# Patient Record
Sex: MALE | Race: WHITE | Employment: FULL TIME | URBAN - METROPOLITAN AREA
[De-identification: names, ages, dates, MRNs, and addresses within clinical notes are randomized per-mention and may not be internally consistent; named-entity substitution may affect disease eponyms.]

---

## 2020-04-02 ENCOUNTER — HOSPITAL ENCOUNTER (EMERGENCY)
Age: 55
Discharge: HOME | End: 2020-04-02
Attending: EMERGENCY MEDICINE
Payer: COMMERCIAL

## 2020-04-02 ENCOUNTER — APPOINTMENT (OUTPATIENT)
Dept: CT IMAGING | Age: 55
End: 2020-04-02
Attending: EMERGENCY MEDICINE
Payer: COMMERCIAL

## 2020-04-02 VITALS
RESPIRATION RATE: 16 BRPM | TEMPERATURE: 98.4 F | HEART RATE: 77 BPM | OXYGEN SATURATION: 96 % | SYSTOLIC BLOOD PRESSURE: 156 MMHG | DIASTOLIC BLOOD PRESSURE: 81 MMHG | BODY MASS INDEX: 33.04 KG/M2 | WEIGHT: 218 LBS | HEIGHT: 68 IN

## 2020-04-02 DIAGNOSIS — N20.1 URETEROLITHIASIS: Primary | ICD-10-CM

## 2020-04-02 LAB
ALBUMIN SERPL-MCNC: 3.2 G/DL (ref 3.5–5)
ALBUMIN/GLOB SERPL: 0.9 {RATIO} (ref 1.2–3.5)
ALP SERPL-CCNC: 46 U/L (ref 50–136)
ALT SERPL-CCNC: 38 U/L (ref 12–65)
ANION GAP SERPL CALC-SCNC: 8 MMOL/L (ref 7–16)
AST SERPL-CCNC: 23 U/L (ref 15–37)
BASOPHILS # BLD: 0 K/UL (ref 0–0.2)
BASOPHILS NFR BLD: 1 % (ref 0–2)
BILIRUB SERPL-MCNC: 0.2 MG/DL (ref 0.2–1.1)
BUN SERPL-MCNC: 15 MG/DL (ref 6–23)
CALCIUM SERPL-MCNC: 8 MG/DL (ref 8.3–10.4)
CHLORIDE SERPL-SCNC: 111 MMOL/L (ref 98–107)
CO2 SERPL-SCNC: 24 MMOL/L (ref 21–32)
CREAT SERPL-MCNC: 0.92 MG/DL (ref 0.8–1.5)
DIFFERENTIAL METHOD BLD: NORMAL
EOSINOPHIL # BLD: 0.1 K/UL (ref 0–0.8)
EOSINOPHIL NFR BLD: 2 % (ref 0.5–7.8)
ERYTHROCYTE [DISTWIDTH] IN BLOOD BY AUTOMATED COUNT: 13.1 % (ref 11.9–14.6)
GLOBULIN SER CALC-MCNC: 3.5 G/DL (ref 2.3–3.5)
GLUCOSE SERPL-MCNC: 171 MG/DL (ref 65–100)
HCT VFR BLD AUTO: 42 % (ref 41.1–50.3)
HGB BLD-MCNC: 13.7 G/DL (ref 13.6–17.2)
IMM GRANULOCYTES # BLD AUTO: 0 K/UL (ref 0–0.5)
IMM GRANULOCYTES NFR BLD AUTO: 0 % (ref 0–5)
LYMPHOCYTES # BLD: 1.3 K/UL (ref 0.5–4.6)
LYMPHOCYTES NFR BLD: 16 % (ref 13–44)
MCH RBC QN AUTO: 31.1 PG (ref 26.1–32.9)
MCHC RBC AUTO-ENTMCNC: 32.6 G/DL (ref 31.4–35)
MCV RBC AUTO: 95.5 FL (ref 79.6–97.8)
MONOCYTES # BLD: 0.6 K/UL (ref 0.1–1.3)
MONOCYTES NFR BLD: 7 % (ref 4–12)
NEUTS SEG # BLD: 6.3 K/UL (ref 1.7–8.2)
NEUTS SEG NFR BLD: 75 % (ref 43–78)
NRBC # BLD: 0 K/UL (ref 0–0.2)
PLATELET # BLD AUTO: 163 K/UL (ref 150–450)
PMV BLD AUTO: 10 FL (ref 9.4–12.3)
POTASSIUM SERPL-SCNC: 3.9 MMOL/L (ref 3.5–5.1)
PROT SERPL-MCNC: 6.7 G/DL (ref 6.3–8.2)
RBC # BLD AUTO: 4.4 M/UL (ref 4.23–5.6)
SODIUM SERPL-SCNC: 143 MMOL/L (ref 136–145)
WBC # BLD AUTO: 8.3 K/UL (ref 4.3–11.1)

## 2020-04-02 PROCEDURE — 80053 COMPREHEN METABOLIC PANEL: CPT

## 2020-04-02 PROCEDURE — 96375 TX/PRO/DX INJ NEW DRUG ADDON: CPT

## 2020-04-02 PROCEDURE — 96374 THER/PROPH/DIAG INJ IV PUSH: CPT

## 2020-04-02 PROCEDURE — 74176 CT ABD & PELVIS W/O CONTRAST: CPT

## 2020-04-02 PROCEDURE — 85025 COMPLETE CBC W/AUTO DIFF WBC: CPT

## 2020-04-02 PROCEDURE — 99284 EMERGENCY DEPT VISIT MOD MDM: CPT

## 2020-04-02 PROCEDURE — 74011250636 HC RX REV CODE- 250/636: Performed by: EMERGENCY MEDICINE

## 2020-04-02 RX ORDER — DICLOFENAC SODIUM 50 MG/1
50 TABLET, DELAYED RELEASE ORAL 2 TIMES DAILY
Qty: 20 TAB | Refills: 0 | Status: SHIPPED | OUTPATIENT
Start: 2020-04-02

## 2020-04-02 RX ORDER — MORPHINE SULFATE 2 MG/ML
4 INJECTION, SOLUTION INTRAMUSCULAR; INTRAVENOUS ONCE
Status: COMPLETED | OUTPATIENT
Start: 2020-04-02 | End: 2020-04-02

## 2020-04-02 RX ORDER — KETOROLAC TROMETHAMINE 30 MG/ML
30 INJECTION, SOLUTION INTRAMUSCULAR; INTRAVENOUS ONCE
Status: COMPLETED | OUTPATIENT
Start: 2020-04-02 | End: 2020-04-02

## 2020-04-02 RX ORDER — TAMSULOSIN HYDROCHLORIDE 0.4 MG/1
0.4 CAPSULE ORAL DAILY
Qty: 15 CAP | Refills: 0 | Status: SHIPPED | OUTPATIENT
Start: 2020-04-02 | End: 2020-04-17

## 2020-04-02 RX ORDER — HYDROCODONE BITARTRATE AND ACETAMINOPHEN 5; 325 MG/1; MG/1
1 TABLET ORAL
Qty: 10 TAB | Refills: 0 | Status: SHIPPED | OUTPATIENT
Start: 2020-04-02 | End: 2020-04-05

## 2020-04-02 RX ADMIN — MORPHINE SULFATE 4 MG: 2 INJECTION, SOLUTION INTRAMUSCULAR; INTRAVENOUS at 20:56

## 2020-04-02 RX ADMIN — KETOROLAC TROMETHAMINE 30 MG: 30 INJECTION, SOLUTION INTRAMUSCULAR at 20:58

## 2020-04-02 RX ADMIN — SODIUM CHLORIDE 1000 ML: 900 INJECTION, SOLUTION INTRAVENOUS at 20:55

## 2020-04-03 NOTE — ED NOTES
I have reviewed discharge instructions with the patient. The patient verbalized understanding. Patient left ED via Discharge Method: ambulatory to Home with family. Opportunity for questions and clarification provided. Patient given 3 scripts. To continue your aftercare when you leave the hospital, you may receive an automated call from our care team to check in on how you are doing. This is a free service and part of our promise to provide the best care and service to meet your aftercare needs.  If you have questions, or wish to unsubscribe from this service please call [phone]. Thank you for Choosing our [facility] Emergency Department.

## 2020-04-03 NOTE — ED TRIAGE NOTES
Pt arrives via [facility] from home. Pt was standing and began experiencing R testicle pain and R flank pain. Pt denies urinary pain. Pt states the pain is better when standing.

## 2020-04-03 NOTE — DISCHARGE INSTRUCTIONS
CT shows a small stone. It is down by the bladder so it should pass in a reasonably short amount of time. Use the medications for symptom control until that happens.   Follow-up with urology for reevaluation

## 2020-04-03 NOTE — ED PROVIDER NOTES
53-year-old male presenting for right flank pain and testicular pain. The history is provided by the patient. Testicle Pain   This is a new problem. The current episode started 1 to 2 hours ago. The problem occurs constantly. The problem has not changed since onset. Pertinent negatives include no dysuria, no genital itching, no genital lesions, no genital rash, no penile discharge, no penile pain, no testicular mass, no swelling, no scrotal pain, no priapism and no inability to urinate. The symptoms occur spontaneously. Associated symptoms include nausea, abdominal pain and flank pain. Pertinent negatives include no anorexia, no diaphoresis, no vomiting, no abdominal swelling, no frequency, no constipation and no diarrhea. There has been no fever. He has tried nothing for the symptoms. The treatment provided no relief. Patient has had no prior STD. Past Medical History:   Diagnosis Date    Hypertension     controlled w/med    MMT (medial meniscus tear)     with LMT left knee       Past Surgical History:   Procedure Laterality Date    HX ADENOIDECTOMY      HX CARPAL TUNNEL RELEASE      bilat    HX KNEE ARTHROSCOPY      right    HX MYRINGOTOMY      HX TONSILLECTOMY      UPPER ARM/ELBOW SURGERY UNLISTED      right elbow x2         No family history on file.     Social History     Socioeconomic History    Marital status:      Spouse name: Not on file    Number of children: Not on file    Years of education: Not on file    Highest education level: Not on file   Occupational History    Not on file   Social Needs    Financial resource strain: Not on file    Food insecurity     Worry: Not on file     Inability: Not on file    Transportation needs     Medical: Not on file     Non-medical: Not on file   Tobacco Use    Smoking status: Never Smoker    Smokeless tobacco: Current User   Substance and Sexual Activity    Alcohol use: Yes     Comment: occasion    Drug use: Not on file    Sexual activity: Not on file   Lifestyle    Physical activity     Days per week: Not on file     Minutes per session: Not on file    Stress: Not on file   Relationships    Social connections     Talks on phone: Not on file     Gets together: Not on file     Attends [employer] service: Not on file     Active member of club or organization: Not on file     Attends meetings of clubs or organizations: Not on file     Relationship status: Not on file    Intimate partner violence     Fear of current or ex partner: Not on file     Emotionally abused: Not on file     Physically abused: Not on file     Forced sexual activity: Not on file   Other Topics Concern    Not on file   Social History Narrative    Not on file         ALLERGIES: Patient has no known allergies. Review of Systems   Constitutional: Negative for diaphoresis. Gastrointestinal: Positive for abdominal pain and nausea. Negative for anorexia, constipation, diarrhea and vomiting. Genitourinary: Positive for flank pain and testicular pain. Negative for dysuria, frequency, penile discharge and penile pain. All other systems reviewed and are negative. Vitals:    04/02/20 2029   BP: 152/80   Pulse: 79   Resp: 18   Temp: 98.3 °F (36.8 °C)   SpO2: 95%   Weight: 98.9 kg (218 lb)   Height: 5' 8\" (1.727 m)            Physical Exam  Vitals signs and nursing note reviewed. Constitutional:       Appearance: He is well-developed. He is obese. Comments: Appears uncomfortable   HENT:      Head: Normocephalic and atraumatic. Eyes:      Conjunctiva/sclera: Conjunctivae normal.      Pupils: Pupils are equal, round, and reactive to light. Neck:      Musculoskeletal: Normal range of motion and neck supple. Cardiovascular:      Rate and Rhythm: Normal rate and regular rhythm. Heart sounds: Normal heart sounds. Pulmonary:      Effort: Pulmonary effort is normal.      Breath sounds: Normal breath sounds.    Abdominal:      General: Bowel sounds are normal.      Palpations: Abdomen is soft. Genitourinary:     Penis: Normal.       Scrotum/Testes: No swelling. Normal.      Comments: Inspection of the penis and testicles is unremarkable, cremasteric reflex intact, no masses, nontender to palpation  Musculoskeletal: Normal range of motion. General: No deformity. Comments: Right-sided CVA tenderness   Skin:     General: Skin is warm and dry. Neurological:      Mental Status: He is alert and oriented to person, place, and time. Cranial Nerves: No cranial nerve deficit. Psychiatric:         Behavior: Behavior normal.          MDM  Number of Diagnoses or Management Options  Ureterolithiasis:   Diagnosis management comments: 53-year-old male presenting for right-sided flank and testicular pain. Concern for torsion, kidney stone, urinary tract infection, prostatitis. Exam is not consistent with prostatitis or testicular torsion. My suspicion is this is a kidney stone with pain referred to the testicle.        Amount and/or Complexity of Data Reviewed  Clinical lab tests: ordered and reviewed (Results for orders placed or performed during the hospital encounter of 04/02/20  -CBC WITH AUTOMATED DIFF       Result                      Value             Ref Range           WBC                         8.3               4.3 - 11.1 K*       RBC                         4.40              4.23 - 5.6 M*       HGB                         13.7              13.6 - 17.2 *       HCT                         42.0              41.1 - 50.3 %       MCV                         95.5              79.6 - 97.8 *       MCH                         31.1              26.1 - 32.9 *       MCHC                        32.6              31.4 - 35.0 *       RDW                         13.1              11.9 - 14.6 %       PLATELET                    163               150 - 450 K/*       MPV                         10.0              9.4 - 12.3 FL       ABSOLUTE NRBC 0.00              0.0 - 0.2 K/*       DF                          AUTOMATED                             NEUTROPHILS                 75                43 - 78 %           LYMPHOCYTES                 16                13 - 44 %           MONOCYTES                   7                 4.0 - 12.0 %        EOSINOPHILS                 2                 0.5 - 7.8 %         BASOPHILS                   1                 0.0 - 2.0 %         IMMATURE GRANULOCYTES       0                 0.0 - 5.0 %         ABS. NEUTROPHILS            6.3               1.7 - 8.2 K/*       ABS. LYMPHOCYTES            1.3               0.5 - 4.6 K/*       ABS. MONOCYTES              0.6               0.1 - 1.3 K/*       ABS. EOSINOPHILS            0.1               0.0 - 0.8 K/*       ABS. BASOPHILS              0.0               0.0 - 0.2 K/*       ABS. IMM. GRANS.            0.0               0.0 - 0.5 K/*  -METABOLIC PANEL, COMPREHENSIVE       Result                      Value             Ref Range           Sodium                      143               136 - 145 mm*       Potassium                   3.9               3.5 - 5.1 mm*       Chloride                    111 (H)           98 - 107 mmo*       CO2                         24                21 - 32 mmol*       Anion gap                   8                 7 - 16 mmol/L       Glucose                     171 (H)           65 - 100 mg/*       BUN                         15                6 - 23 MG/DL        Creatinine                  0.92              0.8 - 1.5 MG*       GFR est AA                  >60               >60 ml/min/1*       GFR est non-AA              >60               >60 ml/min/1*       Calcium                     8.0 (L)           8.3 - 10.4 M*       Bilirubin, total            0.2               0.2 - 1.1 MG*       ALT (SGPT)                  38                12 - 65 U/L         AST (SGOT)                  23                15 - 37 U/L         Alk.  phosphatase 46 (L)            50 - 136 U/L        Protein, total              6.7               6.3 - 8.2 g/*       Albumin                     3.2 (L)           3.5 - 5.0 g/*       Globulin                    3.5               2.3 - 3.5 g/*       A-G Ratio                   0.9 (L)           1.2 - 3.5      )  Tests in the radiology section of CPT®: ordered and reviewed (Ct Abd Pelv Wo Cont    Result Date: 4/2/2020  CT ABDOMEN AND PELVIS FOR STONES WITHOUT CONTRAST INDICATION:  Right flank pain radiating to the testicle. Multiple axial images were obtained through the abdomen and pelvis without intravenous or oral contrast.  Radiation dose reduction techniques were used for this study: All CT scans performed at this facility use one or all of the following: Automated exposure control, adjustment of the mA and/or kVp according to patient's size, iterative reconstruction. COMPARISON: None FINDINGS: -KIDNEYS: Minimal right hydronephrosis. No calcified kidney stones. -URETERS: Punctate 1 to 2 mm stone lower at the mid SI joint level. -URINARY BLADDER: Unremarkable without radiopaque urinary tract calculi. -OTHER: Included portion of the liver and spleen unremarkable except for fatty infiltration of the liver. No free air. Aorta is normal caliber. IMPRESSION: Punctate, 1 to 2 mm stone lower right ureter with minimal hydronephrosis.     )  Independent visualization of images, tracings, or specimens: yes    Risk of Complications, Morbidity, and/or Mortality  Presenting problems: high  Diagnostic procedures: high  Management options: moderate  General comments: I personally reviewed the patient's vital signs, laboratory tests, and/or radiological findings. I discussed these findings with the patient and their significance. I answered all questions and gave the patient clear return precautions.   The patient was discharged from the emergency department in stable condition    I wore appropriate PPE throughout this patient's ED visit.  Jacolyn Canavan, MD, 9:38 PM          Patient Progress  Patient progress: improved         Procedures

## 2021-08-06 ENCOUNTER — HOSPITAL ENCOUNTER (EMERGENCY)
Facility: MEDICAL CENTER | Age: 56
End: 2021-08-06
Attending: EMERGENCY MEDICINE
Payer: COMMERCIAL

## 2021-08-06 VITALS
DIASTOLIC BLOOD PRESSURE: 88 MMHG | BODY MASS INDEX: 37.74 KG/M2 | SYSTOLIC BLOOD PRESSURE: 142 MMHG | OXYGEN SATURATION: 96 % | HEIGHT: 72 IN | TEMPERATURE: 97.5 F | WEIGHT: 278.66 LBS | RESPIRATION RATE: 16 BRPM | HEART RATE: 95 BPM

## 2021-08-06 DIAGNOSIS — T15.02XA FOREIGN BODY OF LEFT CORNEA, INITIAL ENCOUNTER: ICD-10-CM

## 2021-08-06 DIAGNOSIS — S05.02XA ABRASION OF LEFT CORNEA, INITIAL ENCOUNTER: ICD-10-CM

## 2021-08-06 PROCEDURE — 700101 HCHG RX REV CODE 250: Performed by: EMERGENCY MEDICINE

## 2021-08-06 PROCEDURE — 65220 REMOVE FOREIGN BODY FROM EYE: CPT

## 2021-08-06 PROCEDURE — 65222 REMOVE FOREIGN BODY FROM EYE: CPT

## 2021-08-06 PROCEDURE — 99284 EMERGENCY DEPT VISIT MOD MDM: CPT

## 2021-08-06 RX ORDER — PROPARACAINE HYDROCHLORIDE 5 MG/ML
1 SOLUTION/ DROPS OPHTHALMIC ONCE
Status: COMPLETED | OUTPATIENT
Start: 2021-08-06 | End: 2021-08-06

## 2021-08-06 RX ORDER — ERYTHROMYCIN 5 MG/G
1 OINTMENT OPHTHALMIC 4 TIMES DAILY
Qty: 3.5 G | Refills: 0 | Status: SHIPPED | OUTPATIENT
Start: 2021-08-06

## 2021-08-06 RX ORDER — ERYTHROMYCIN 5 MG/G
OINTMENT OPHTHALMIC ONCE
Status: DISCONTINUED | OUTPATIENT
Start: 2021-08-06 | End: 2021-08-06 | Stop reason: HOSPADM

## 2021-08-06 RX ADMIN — FLUORESCEIN SODIUM 1 MG: 1 STRIP OPHTHALMIC at 16:30

## 2021-08-06 RX ADMIN — PROPARACAINE HYDROCHLORIDE 1 DROP: 5 SOLUTION/ DROPS OPHTHALMIC at 16:30

## 2021-08-06 ASSESSMENT — ENCOUNTER SYMPTOMS
EYE PAIN: 1
EYE REDNESS: 1
BLURRED VISION: 0
DOUBLE VISION: 0

## 2021-08-06 NOTE — ED TRIAGE NOTES
.  Chief Complaint   Patient presents with   • Eye Injury     left eye pain, occured yesterday when putting boxes away in garage eye red irritated

## 2021-08-06 NOTE — ED NOTES
Ghassan lens started with 500cc bag of NS, pt tolerating well and informed to ring call light for any concerns.

## 2021-08-06 NOTE — ED PROVIDER NOTES
ED Provider Note    Scribed for Tom Rapp M.D. by Sherry Mac. 8/6/2021, 4:01 PM.    Primary care provider: None reported.  Means of arrival: walk-in  History obtained from: patient  History limited by: none    CHIEF COMPLAINT  Chief Complaint   Patient presents with   • Eye Injury     left eye pain, occured yesterday when putting boxes away in garage eye red irritated       HPI  Edis Thompson is a 56 y.o. male who presents to the Emergency Department for left eye pain and irritation onset yesterday. Patient was moving boxes in his garage and felt something in his eye. He flush his eye but his symptoms did not improve. He denies any history of eye problems and has not worn contact lenses for the past year. He denies any vision changes. He has a history of type II diabetes.     REVIEW OF SYSTEMS  Review of Systems   Eyes: Positive for pain and redness. Negative for blurred vision and double vision.       PAST MEDICAL HISTORY   has a past medical history of Diabetes ([facility]).    SURGICAL HISTORY  patient denies any surgical history    SOCIAL HISTORY  Social History     Tobacco Use   • Smoking status: Never Smoker   • Smokeless tobacco: Never Used   Vaping Use   • Vaping Use: Never used   Substance Use Topics   • Alcohol use: Not Currently   • Drug use: Never      Social History     Substance and Sexual Activity   Drug Use Never       FAMILY HISTORY  History reviewed. No pertinent family history.    CURRENT MEDICATIONS  Home Medications     Reviewed by Gladis Mistry R.N. (Registered Nurse) on 08/06/21 at 1541  Med List Status: Complete   Medication Last Dose Status        Patient Lan Taking any Medications                       ALLERGIES  No Known Allergies        PHYSICAL EXAM  VITAL SIGNS: /88   Pulse 95   Temp 36.4 °C (97.5 °F) (Temporal)   Resp 16   Ht 1.829 m (6')   Wt (!) 126 kg (278 lb 10.6 oz)   SpO2 96%   BMI 37.79 kg/m²   Vitals reviewed.  Constitutional: Well developed,  Well nourished, No acute distress, Non-toxic appearance.   HENT: Normocephalic, Atraumatic, Bilateral external ears normal, Oropharynx moist, No oral exudates, Nose normal.   Eyes: PERRL, EOMI, injected conjunctiva, upper and lower lid even, anterior chamber clear. No discharge. Slit lamp exam: upper and lower lids everted without visualization of foreign body. At 10'oclock foreign body visualized in cornea, diffuse small foreign bodies floating in eye, uptake with fluorescein with diffuse bold uptake in area of foreign body. Defect in cornea where foreign body was present.   Neurologic: Alert, Normal motor function No focal deficits noted.   Psychiatric: Affect normal    COURSE & MEDICAL DECISION MAKING  Pertinent Labs & Imaging studies reviewed. (See chart for details)    4:01 PM Patient seen and examined at bedside. Patient will be treated with proparacaine 0.5% solution and fluorescein ophthalmic strip 1 mg for his symptoms.      4:29 PM - Foreign body removal performed at this time.     PROCEDURE    Foreign Body Removal Procedure Note    Indication: Foreign body in cornea    Procedure: The foreign body was removed using a q-tip.    The patient tolerated the procedure well.    Complications: None      Differential diagnosis includes foreign body, perforation, glaucoma.  Patient has a reactive pupil and no obvious foreign body material in his eye.  This consistent with a foreign body in ocular, or other acute process.    4:39 PM - Paged opthalmology.     4:56 PM - Ghassan lens started with 500 cc normal saline. Pt feels much better.     5:16 PM - I discussed the patient's case and the above findings with Dr. Altman (Opthalmology) who will see the patient in clinic on Monday and recommends treating with erythromycin ointment.      5:29 PM - Patient will be treated with erythromycin ointment.    5:32 PM - Patient reevaluated at bedside and feels much better after Ghassan lens treatment. Patient updated on plan for  discharge and follow up with opthalmology. His symptoms with be treated erythromycin ointment, patient instructed not to drive. Answered patient's questions. Patient verbalizes understanding and agreement to this plan of care.      The patient will return for new or worsening symptoms and is stable at the time of discharge.    DISPOSITION:  Patient will be discharged home in stable condition.    FOLLOW UP:  Suzan Altman M.D.  [address] [place] [id number]  [phone]    Schedule an appointment as soon as possible for a visit in 3 days        OUTPATIENT MEDICATIONS:  New Prescriptions    ERYTHROMYCIN 5 MG/GM OINTMENT    Apply 1 Application to left eye 4 times a day.        FINAL IMPRESSION  1. Foreign body of left cornea, initial encounter    2. Abrasion of left cornea, initial encounter          Sherry CURRY (Scribe), am scribing for, and in the presence of, Tom Rapp M.D..    Electronically signed by: Sherry Mac (Scribe), 8/6/2021    Tom CURRY M.D. personally performed the services described in this documentation, as scribed by Sherry Mac in my presence, and it is both accurate and complete.    The note accurately reflects work and decisions made by me.  Tom Rapp M.D.  8/6/2021  8:01 PM

## 2021-08-07 NOTE — DISCHARGE INSTRUCTIONS
Return to the emergency department for increasing eye pain, increasing redness, decrease in vision or other concerns.  Use ointment every 4-6 hours.  The ointment may blurrier vision therefore driving with ointment in your eye is not recommended.  Follow-up with eye doctor on Monday

## 2023-09-22 ENCOUNTER — TELEPHONE (OUTPATIENT)
Dept: ORTHOPEDIC SURGERY | Age: 58
End: 2023-09-22

## 2023-09-22 NOTE — TELEPHONE ENCOUNTER
Pt had a MRI right knee, he has a high grade  Tear ACL graft, Massred tear anterior horn  Lateral meniscus,  Knee joint with loose  Bodies, we need to call [facility] [phone] if anyone is interested

## 2023-09-26 ENCOUNTER — OFFICE VISIT (OUTPATIENT)
Dept: ORTHOPEDIC SURGERY | Age: 58
End: 2023-09-26

## 2023-09-26 VITALS — BODY MASS INDEX: 36.37 KG/M2 | WEIGHT: 240 LBS | HEIGHT: 68 IN

## 2023-09-26 DIAGNOSIS — S83.511A RUPTURE OF ANTERIOR CRUCIATE LIGAMENT OF RIGHT KNEE, INITIAL ENCOUNTER: Primary | ICD-10-CM

## 2023-09-26 DIAGNOSIS — S83.231A COMPLEX TEAR OF MEDIAL MENISCUS OF RIGHT KNEE AS CURRENT INJURY, INITIAL ENCOUNTER: ICD-10-CM

## 2023-09-26 DIAGNOSIS — M17.0 PRIMARY OSTEOARTHRITIS OF BOTH KNEES: ICD-10-CM

## 2023-09-26 DIAGNOSIS — T84.84XA PAINFUL ORTHOPAEDIC HARDWARE (HCC): ICD-10-CM

## 2023-09-26 DIAGNOSIS — M23.41 LOOSE BODY IN KNEE, RIGHT KNEE: ICD-10-CM

## 2023-09-26 DIAGNOSIS — S83.271A COMPLEX TEAR OF LATERAL MENISCUS, CURRENT INJURY, RIGHT KNEE, INITIAL ENCOUNTER: ICD-10-CM

## 2023-09-26 RX ORDER — ROSUVASTATIN CALCIUM 10 MG/1
10 TABLET, COATED ORAL
COMMUNITY
Start: 2023-04-24